# Patient Record
Sex: FEMALE | Race: WHITE
[De-identification: names, ages, dates, MRNs, and addresses within clinical notes are randomized per-mention and may not be internally consistent; named-entity substitution may affect disease eponyms.]

---

## 2018-09-01 ENCOUNTER — HOSPITAL ENCOUNTER (EMERGENCY)
Dept: HOSPITAL 97 - ER | Age: 48
Discharge: HOME | End: 2018-09-01
Payer: COMMERCIAL
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
## 2018-09-01 DIAGNOSIS — S03.41XA: Primary | ICD-10-CM

## 2018-09-01 DIAGNOSIS — M25.562: ICD-10-CM
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
## 2018-09-01 DIAGNOSIS — M25.521: ICD-10-CM
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
## 2018-09-01 PROCEDURE — 70450 CT HEAD/BRAIN W/O DYE: CPT

## 2018-09-01 PROCEDURE — 70486 CT MAXILLOFACIAL W/O DYE: CPT

## 2018-09-01 PROCEDURE — 76377 3D RENDER W/INTRP POSTPROCES: CPT

## 2018-09-01 PROCEDURE — 72125 CT NECK SPINE W/O DYE: CPT

## 2018-09-01 PROCEDURE — 99284 EMERGENCY DEPT VISIT MOD MDM: CPT

## 2018-09-01 NOTE — EDPHYS
Physician Documentation                                                                           

 [facility]                                                                

Name: Marilyn Melendez                                                                                 

Age: 47 yrs                                                                                       

Sex: Female                                                                                       

: 1970                                                                                   

MRN: [id number]                                                                                   

Arrival Date: 2018                                                                          

Time: 18:20                                                                                       

Account#: [id number]                                                                            

Bed 7                                                                                             

Private MD: out of town, doctor                                                                   

ED Physician Tobias Sanchez                                                                       

HPI:                                                                                              

                                                                                             

18:45 This 47 yrs old  Female presents to ER via Ambulatory with complaints of Fall  jmm 

      Injury.                                                                                     

18:45 Details of fall: The patient fell from an upright position, while walking. Onset: The   jmm 

      symptoms/episode began/occurred acutely, 3 hour(s) ago. Associated injuries: The            

      patient sustained injury to the head, pain. This is a 47 year old female with no            

      chronic medical conditions that presents to the ED with jaw pain after a fall which         

      occurred approx 3 hours prior to arrival. Patient states she slipped [place]         

      [place] Patient states falling and hitting the right side of her jaw        

      against a wooden rail. Denies LOC. States she scrapped her knees as well, but denies        

      pain. .                                                                                     

                                                                                                  

OB/GYN:                                                                                           

19:15 LMP N/A - Hysterectomy                                                                  tl2 

                                                                                                  

Historical:                                                                                       

- Allergies:                                                                                      

18:29 No Known Allergies;                                                                     la1 

- PMHx:                                                                                           

18:29 None;                                                                                   la1 

                                                                                                  

- Immunization history:: Adult Immunizations up to date.                                          

- Social history:: Smoking status: Patient/guardian denies using tobacco.                         

- Immunization history: Last tetanus immunization: unknown.                                       

- Ebola Screening: : No symptoms or risks identified at this time.                                

                                                                                                  

                                                                                                  

ROS:                                                                                              

18:45 Constitutional: Negative for fever, chills, and weight loss.                            jmm 

18:45 Neck: Negative for injury, pain, and swelling, Cardiovascular: Negative for chest pain,     

      palpitations, and edema, Respiratory: Negative for shortness of breath, cough,              

      wheezing, and pleuritic chest pain.                                                         

18:45 ENT: Positive for injury or acute deformity, abrasion.                                      

18:45 Neck: Positive for pain at rest.                                                            

18:45 MS/extremity: Positive for abrasion.                                                        

18:45 Skin: Positive for abrasion(s).                                                             

18:45 All other systems are negative.                                                             

                                                                                                  

Exam:                                                                                             

18:45 Constitutional:  This is a well developed, well nourished patient who is awake, alert,  jmm 

      and in no acute distress. Head/Face:  atraumatic.                                           

18:45 ENT: abrasion noted to the right mandible, no obvious deformity appreciated, no             

      malocclusion is apprecaited.                                                                

18:45 Neck: C-spine: appears grossly normal, no vertebral tenderness, ROM/movement: is normal.    

18:45 Musculoskeletal/extremity: ROM: intact in all extremities, no bony tenderness               

      appreciated to the knees bilaterally, compartments are soft, NVI.                           

18:45 Skin: abrasions noted to the knees bilaterally, abrasion noted to the right mandibular      

      region .                                                                                    

18:45 Neuro: Orientation: is normal, Mentation: is normal, Memory: is normal.                     

18:45 Psych: Behavior/mood is pleasant, cooperative.                                              

                                                                                                  

Vital Signs:                                                                                      

18:29  / 75; Pulse 62; Resp 19; Temp 97.5; Pulse Ox 100% on R/A; Weight 65.77 kg;       la1 

      Height 5 ft. 2 in. (157.48 cm);                                                             

19:59  / 67; Pulse 60; Resp 18; Pulse Ox 96% on R/A;                                    tl2 

20:42  / 64; Pulse 58; Resp 18; Pulse Ox 97% on R/A;                                    tl2 

21:17  / 63; Pulse 53; Resp 18; Pulse Ox 98% on R/A; Pain 2/10;                         tl2 

18:29 Body Mass Index 26.52 (65.77 kg, 157.48 cm)                                             la1 

                                                                                                  

[place] Coma Score:                                                                               

18:35 Eye Response: spontaneous(4). Verbal Response: oriented(5). Motor Response: obeys       aa5 

      commands(6). Total: 15.                                                                     

19:59 Eye Response: spontaneous(4). Verbal Response: oriented(5). Motor Response: obeys       tl2 

      commands(6). Total: 15.                                                                     

                                                                                                  

Trauma Score (Adult):                                                                             

18:35 Eye Response: spontaneous(1); Verbal Response: oriented(1); Motor Response: obeys       aa5 

      commands(2); Systolic BP: > 89 mm Hg(4); Respiratory Rate: 10 to 29 per min(4); [place]     

      Score: 15; Trauma Score: 12                                                                 

19:59 Eye Response: spontaneous(1); Verbal Response: oriented(1); Motor Response: obeys       tl2 

      commands(2); Systolic BP: > 89 mm Hg(4); Respiratory Rate: 10 to 29 per min(4); [place]     

      Score: 15; Trauma Score: 12                                                                 

                                                                                                  

MDM:                                                                                              

18:45 Patient medically screened.                                                             [facility] 

21:04 Data reviewed: vital signs, nurses notes.                                               [facility] 

21:04 Data reviewed: radiologic studies, CT scan. Counseling: I had a detailed discussion     [facility] 

      with the patient and/or guardian regarding: the historical points, exam findings, and       

      any diagnostic results supporting the discharge/admit diagnosis, radiology results, the     

      need for outpatient follow up, to return to the emergency department if symptoms worsen     

      or persist or if there are any questions or concerns that arise at home.                    

21:04 Response to treatment: the patient's symptoms have mildly improved after treatment.     [facility] 

                                                                                                  

                                                                                             

18:46 Order name: CT Facial Bones W/O Con; Complete Time: 19:50                               [facility] 

                                                                                             

18:46 Order name: CT Head C Spine; Complete Time: 19:50                                       [facility] 

                                                                                                  

Administered Medications:                                                                         

20:17 Drug: Norco 5 mg-325 mg 1 tabs Route: PO;                                               tl1 

21:00 Follow up: Response: No adverse reaction; Pain is decreased                             tl2 

21:08 Drug: Flexeril 10 mg Route: PO;                                                         tl2 

21:20 Follow up: Response: No adverse reaction; Medication administered at discharge.         tl2 

                                                                                                  

                                                                                                  

Disposition:                                                                                      

                                                                                             

08:03 Co-signature as Attending Physician, Tobias Sanchez MD I agree with the assessment and   kdr 

      plan of care.                                                                               

                                                                                                  

Disposition:                                                                                      

18 21:04 Discharged to Home. Impression: Sprain of jaw.                                     

- Condition is Stable.                                                                            

- Discharge Instructions: Jaw Contusion, Jaw Range of Motion Exercises.                           

- Prescriptions for Ultram 50 mg Oral Tablet - take 1 tablet by ORAL route every 6                

  hours As needed; 20 tablet. orphenadrine citrate 100 mg Oral Tablet Sustained Release           

  - take 1 tablet by ORAL route 2 times per day As needed; 20 tablet.                             

- Medication Reconciliation Form, Thank You Letter, Antibiotic Education, Prescription            

  Opioid Use form.                                                                                

- Follow up: Latrice Leos MD; When: 2 - 3 days; Reason: Recheck today's                    

  complaints, Continuance of care, Re-evaluation by your physician.                               

                                                                                                  

                                                                                                  

                                                                                                  

Signatures:                                                                                       

Dispatcher [employer]                           EDMS                                                 

Tobias Sanchez MD MD kdr Mickail, Joel, PA PA   [facility]                                                  

Jenelle Zhao RN                     RN   aa5                                                  

Sidney Spencer RN                         RN   la1                                                  

Las[place], Gema, RN                      RN   tl1                                                  

Josie Thornton RN                        RN   tl2                                                  

                                                                                                  

Corrections: (The following items were deleted from the chart)                                    

                                                                                             

21:20 21:04 2018 21:04 Discharged to Home. Impression: Sprain of jaw. Condition is      tl2 

      Stable. Forms are Medication Reconciliation Form, Thank You Letter, Antibiotic              

      Education, Prescription Opioid Use. Follow up: Latrice Leos; When: 2 - 3 days;         

      Reason: Recheck today's complaints, Continuance of care, Re-evaluation by your              

      physician. sal                                                                              

                                                                                                  

**************************************************************************************************

## 2018-09-01 NOTE — ER
Nurse's Notes                                                                                     

 [facility]                                                                

Name: Marilyn Melendez                                                                                 

Age: 47 yrs                                                                                       

Sex: Female                                                                                       

: 1970                                                                                   

MRN: [id number]                                                                                   

Arrival Date: 2018                                                                          

Time: 18:20                                                                                       

Account#: [id number]                                                                            

Bed 7                                                                                             

Private MD: out of town, doctor                                                                   

Diagnosis: Sprain of jaw                                                                          

                                                                                                  

Presentation:                                                                                     

                                                                                             

18:28 Presenting complaint: Patient states: I fell at about 1500 today and hit the right side la1 

      of my jaw, left knee and right elbow. Pt denies LOC or use of blood thinners. Denies        

      N/V. Transition of care: patient was not received from another setting of care. Onset       

      of symptoms was 2018. Risk Assessment: Do you want to hurt yourself or        

      someone else? Patient reports no desire to harm self or others. Initial Sepsis Screen:      

      Does the patient meet any 2 criteria? No. Patient's initial sepsis screen is negative.      

      Does the patient have a suspected source of infection? No. Patient's initial sepsis         

      screen is negative. Care prior to arrival: None.                                            

18:28 Method Of Arrival: Ambulatory                                                           la1 

18:28 Acuity: BENNY 3                                                                           la1 

18:42 Mechanism of Injury: Fall from standing position. Trauma event details: Injury occurred aa5 

      in the [facility], Injury occurred: at home. Injury occurred: 2018.                                                                                       

                                                                                                  

OB/GYN:                                                                                           

19:15 LMP N/A - Hysterectomy                                                                  tl2 

                                                                                                  

Trauma Activation: Not Applicable                                                                 

 Physician: ED Physician; Name: ; Notified At: ; Arrived At:                                      

 Physician: General Surgeon; Name: ; Notified At: ; Arrived At:                                   

 Physician: Radiology; Name: ; Notified At: ; Arrived At:                                         

 Physician: Respiratory; Name: ; Notified At: ; Arrived At:                                       

 Physician: Lab; Name: ; Notified At: ; Arrived At:                                               

                                                                                                  

Historical:                                                                                       

- Allergies:                                                                                      

18:29 No Known Allergies;                                                                     la1 

- PMHx:                                                                                           

18:29 None;                                                                                   la1 

                                                                                                  

- Immunization history:: Adult Immunizations up to date.                                          

- Social history:: Smoking status: Patient/guardian denies using tobacco.                         

- Immunization history: Last tetanus immunization: unknown.                                       

- Ebola Screening: : No symptoms or risks identified at this time.                                

                                                                                                  

                                                                                                  

Screenin:43 Abuse screen: Denies threats or abuse. Nutritional screening: No deficits noted.        aa5 

      Tuberculosis screening: No symptoms or risk factors identified. Fall Risk None              

      identified.                                                                                 

                                                                                                  

Primary Survey:                                                                                   

18:35 A: Airway: patent. Breathing/Chest: Respiratory pattern: regular, Respiratory effort:   aa5 

      spontaneous, unlabored, Chest inspection: symmetrical rise and fall of the chest.           

      Circulation: Skin color: pink. Disability Alert.                                            

18:43 Reassessment Airway Airway Patent Breathing/Chest Respiratory pattern Regular           aa5 

      Respiratory effort Spontaneous Unlabored Chest inspection Symmetrical Circulation Color     

      Pink Disability Alert.                                                                      

                                                                                                  

Secondary Survey:                                                                                 

18:35 HEENT: Face Other abrasion and bruising noted to right side of jaw. Gastrointestinal:   aa5 

      No deficits noted. : No deficits noted. Musculoskeletal: Reports pain in right elbow      

      and left knee.                                                                              

                                                                                                  

Assessment:                                                                                       

18:35 General: Appears comfortable, Behavior is calm, cooperative. Pain: Complains of pain in aa5 

      jaw, right elbow, and left knee Pain does not radiate. Pain currently is 5 out of 10 on     

      a pain scale. Quality of pain is described as aching, Is continuous. Neuro: Level of        

      Consciousness is awake, alert, obeys commands, Oriented to person, place, time,             

      situation. EENT: No signs and/or symptoms were reported regarding the EENT system.          

      Cardiovascular: Heart tones S1 S2 present Rhythm is regular. Respiratory: Airway is         

      patent Respiratory effort is even, unlabored, Respiratory pattern is regular,               

      symmetrical. GI: No signs and/or symptoms were reported involving the gastrointestinal      

      system. : No signs and/or symptoms were reported regarding the genitourinary system.      

      Derm: Skin is pink, warm \T\ dry. Abrasions noted to right side of jaw and left knee, no    

      active bleeding noted. Bruising that is dark purple noted to right side of jaw. Pt          

      states "my jaw landed on a 2 x 4". Musculoskeletal: Range of motion: intact in all          

      extremities.                                                                                

19:55 General: Appears in no apparent distress. comfortable, Behavior is calm, cooperative,   tl2 

      appropriate for age. Pain: Complains of pain in jaw, right elbow, left knee. Neuro:         

      Level of Consciousness is awake, alert, obeys commands, Oriented to person, place,          

      time, situation. Cardiovascular: Denies chest pain. Respiratory: Airway is patent           

      Respiratory effort is even, unlabored, Respiratory pattern is regular, symmetrical. GI:     

      No signs and/or symptoms were reported involving the gastrointestinal system. : No        

      signs and/or symptoms were reported regarding the genitourinary system. Derm: Skin is       

      pink, warm \T\ dry. Injury Description: Abrasion sustained to jaw.                          

21:17 Reassessment: Patient appears in no apparent distress at this time. Patient and/or      tl2 

      family updated on plan of care and expected duration. Pain level reassessed. Patient is     

      alert, oriented x 3, equal unlabored respirations, skin warm/dry/pink. Pt verbalized        

      understanding of discharge instructions, need for follow up and prescription usage.         

                                                                                                  

Vital Signs:                                                                                      

18:29  / 75; Pulse 62; Resp 19; Temp 97.5; Pulse Ox 100% on R/A; Weight 65.77 kg;       la1 

      Height 5 ft. 2 in. (157.48 cm);                                                             

19:59  / 67; Pulse 60; Resp 18; Pulse Ox 96% on R/A;                                    tl2 

20:42  / 64; Pulse 58; Resp 18; Pulse Ox 97% on R/A;                                    tl2 

21:17  / 63; Pulse 53; Resp 18; Pulse Ox 98% on R/A; Pain 2/10;                         tl2 

18:29 Body Mass Index 26.52 (65.77 kg, 157.48 cm)                                             la1 

                                                                                                  

Leonie Coma Score:                                                                               

18:35 Eye Response: spontaneous(4). Verbal Response: oriented(5). Motor Response: obeys       aa5 

      commands(6). Total: 15.                                                                     

19:59 Eye Response: spontaneous(4). Verbal Response: oriented(5). Motor Response: obeys       tl2 

      commands(6). Total: 15.                                                                     

                                                                                                  

Trauma Score (Adult):                                                                             

18:35 Eye Response: spontaneous(1); Verbal Response: oriented(1); Motor Response: obeys       aa5 

      commands(2); Systolic BP: > 89 mm Hg(4); Respiratory Rate: 10 to 29 per min(4); Leonie     

      Score: 15; Trauma Score: 12                                                                 

19:59 Eye Response: spontaneous(1); Verbal Response: oriented(1); Motor Response: obeys       tl2 

      commands(2); Systolic BP: > 89 mm Hg(4); Respiratory Rate: 10 to 29 per min(4); Leonie     

      Score: 15; Trauma Score: 12                                                                 

                                                                                                  

ED Course:                                                                                        

18:20 Patient arrived in ED.                                                                  mr  

18:20 out of town, doctor is Private Physician.                                               mr  

18:28 Triage completed.                                                                       la1 

18:29 Arm band placed on left wrist.                                                          la1 

18:32 Jamshid Mckeon PA is PHCP.                                                              m 

18:32 Tobias Sanchez MD is Attending Physician.                                              jmm 

18:35 Patient has correct armband on for positive identification. Bed in low position. Call   aa5 

      light in reach. Side rails up X 1. Adult w/ patient.                                        

18:37 Jenelle Zhao, RN is Primary Nurse.                                                   aa5 

18:42 Patient maintains SpO2 saturation greater than 95% on room air. Thermoregulation: warm  aa5 

      blanket given to patient.                                                                   

19:01 Report given to Gia RN.                                                   aa5 

19:13 CT completed. Patient moved to CT via wheelchair. Patient moved back from CT.           cw1 

19:22 CT Facial Bones W/O Con In Process Unspecified.                                         EDMS

19:22 CT Head C Spine In Process Unspecified.                                                 EDMS

21:04 Latrice Leos MD is Referral Physician.                                           m 

21:17 No provider procedures requiring assistance completed. Patient did not have IV access   tl2 

      during this emergency room visit.                                                           

                                                                                                  

Administered Medications:                                                                         

20:17 Drug: Norco 5 mg-325 mg 1 tabs Route: PO;                                               tl1 

21:00 Follow up: Response: No adverse reaction; Pain is decreased                             tl2 

21:08 Drug: Flexeril 10 mg Route: PO;                                                         tl2 

21:20 Follow up: Response: No adverse reaction; Medication administered at discharge.         tl2 

                                                                                                  

                                                                                                  

Intake:                                                                                           

21:19 PO: 0ml; Total: 0ml.                                                                    tl2 

                                                                                                  

Outcome:                                                                                          

21:04 Discharge ordered by MD.                                                                m 

21:17 Discharged to home ambulatory, with family.                                             tl2 

21:17 Condition: stable                                                                           

21:17 Discharge instructions given to patient, family, Instructed on discharge instructions,      

      follow up and referral plans. medication usage, Demonstrated understanding of               

      instructions, follow-up care, medications, Prescriptions given X 2.                         

21:19 Patient's length of stay in the Emergency Department was greater than 2 hours. waiting  tl2 

      for dispo after CT resultsPatient's length of stay extended due to                          

21:20 Patient left the ED.                                                                    tl2 

                                                                                                  

Signatures:                                                                                       

Dispatcher [employer]                           EDMS                                                 

Jamshid Mckeon PA PA   [facility]                                                  

Mellissa Sanchez                                mr                                                   

Jenelle Zhao, RN                     RN   aa5                                                  

Abi Lindquist                             cw1                                                  

Sidney Spencer RN                         RN   la1                                                  

Gema Huntley RN RN   tl1                                                  

Josie Thornton, RN                        RN   tl2                                                  

                                                                                                  

**************************************************************************************************

## 2018-09-01 NOTE — RAD REPORT
EXAM DESCRIPTION:  CT - [id number] - 9/1/2018 7:22 pm

 

CLINICAL HISTORY:  fall, head injury<Reason For Exam>fall, head injury

Fall with head, face and neck injury

 

COMPARISON:  No comparisons<Comparisons>

 

TECHNIQUE:  Axial 5 mm thick images of the head were obtained.  Axial 2 mm thick images of the cervic
al spine were obtained with sagittal and coronal reconstruction images generated and reviewed.

 

All CT scans are performed using dose optimization technique as appropriate and may include automated
 exposure control or mA/KV adjustment according to patient size.

 

FINDINGS:

No intracranial hemorrhage, mass, edema or acute intracranial finding. No suspicion for acute infarct
ion. No extra-axial fluid collections. Mastoid air cells are clear of significant disease. Orbits, fa
cial bones and sinuses are separately detailed.

 

Cervical body height and alignment are normal. No disk space narrowing. No fracture or acute bony abn
ormality.

 

No paraspinal mass or hematoma.

 

IMPRESSION:  Negative CT head examination for acute or significant finding. Facial bones, sinuses and
 orbits are separately detailed.

 

Negative CT cervical spine examination for acute or significant finding.

## 2018-09-01 NOTE — RAD REPORT
EXAM DESCRIPTION:  CT - Facial Bones W/ Mpr - 9/1/2018 7:22 pm

 

CLINICAL HISTORY:  fall, facial injury<Reason For Exam>fall, facial injury

 

COMPARISON:  No comparisons<Comparisons>

 

TECHNIQUE:  Axial 2 millimeter thick images of the facial bones were obtained with sagittal and coron
al reconstruction imaging.

 

All CT scans are performed using dose optimization technique as appropriate and may include automated
 exposure control or mA/KV adjustment according to patient size.

 

FINDINGS:  No mandible fracture identified. Condyles of the mandible are normally positioned. No faci
al bone fractures are identifiable. No air-fluid level in the paranasal sinuses. Minimal mucosal thic
kening in the posterior sphenoid sinus. Globes and orbital contents are normal. No significant soft t
issue injury identifiable. No foreign body seen.

 

IMPRESSION:  No facial bone fracture. No significant soft tissue finding.